# Patient Record
Sex: FEMALE | Race: WHITE | NOT HISPANIC OR LATINO | Employment: FULL TIME | URBAN - METROPOLITAN AREA
[De-identification: names, ages, dates, MRNs, and addresses within clinical notes are randomized per-mention and may not be internally consistent; named-entity substitution may affect disease eponyms.]

---

## 2017-10-18 ENCOUNTER — CONVERSION ENCOUNTER (OUTPATIENT)
Dept: OTHER | Facility: HOSPITAL | Age: 47
End: 2017-10-18

## 2018-08-22 ENCOUNTER — OFFICE VISIT CONVERTED (OUTPATIENT)
Dept: FAMILY MEDICINE CLINIC | Age: 48
End: 2018-08-22
Attending: NURSE PRACTITIONER

## 2019-10-02 ENCOUNTER — OFFICE VISIT CONVERTED (OUTPATIENT)
Dept: FAMILY MEDICINE CLINIC | Age: 49
End: 2019-10-02
Attending: FAMILY MEDICINE

## 2021-05-18 NOTE — PROGRESS NOTES
"Alessandra Potts 1970     Office/Outpatient Visit    Visit Date: Wed, Oct 2, 2019 02:59 pm    Provider: Bossman Bagley MD (Assistant: Marya Duffy RN)    Location: [facility]        Electronically signed by Bossman Bagley MD on  10/02/2019 04:59:18 PM                             SUBJECTIVE:        CC:     Mrs. Potts is a 49 year old White female.  Nodules to right side of neck;         HPI:         PHQ-9 Depression Screening: Completed form scanned and in chart; Total Score 4     She reports that approximately 36 hours ago she noticed a bump on the right side of her neck.  It is since grown in size, become red and very tender to the touch.  She does not recall getting bitten by anything or any other  inciting event. No new exposures including no new soaps, detergent, perfumes or lotions. She has never experienced anything like this before.  There is no drainage from the lesion.  She denies fever or chills.  No upper respiratory symptoms.  No dysphagia.     ROS:     CONSTITUTIONAL:  Negative for chills, fatigue and fever.      E/N/T:  Negative for nasal congestion, frequent rhinorrhea, sinus pressure and sore throat.      CARDIOVASCULAR:  Negative for chest pain, dizziness and edema.      RESPIRATORY:  Negative for dyspnea and cough.      GASTROINTESTINAL:  Negative for dysphagia and nausea.      INTEGUMENTARY/BREAST:  Positive for erythematous, tender neck \"bump\".      NEUROLOGICAL:  Negative for headaches and weakness.      ENDOCRINE:  Negative for hair loss, heat/cold intolerance, polydipsia, and polyphagia.          [facility]/FM/SH:     Last Reviewed on 10/02/2019 04:57 PM by Bossman Bagley    Past Medical History:                 PAST MEDICAL HISTORY         Endometriosis         GYNECOLOGICAL HISTORY:     miscarriage 1    Contraception: S/P tubal ligation;         Surgical History:         Appendectomy    Tonsillectomy/Adenoidectomy    Tubal Ligation         Family History:     Father: " "Hypertension; Hyperlipidemia     Mother: Healthy     maternal aunts (3) with breasr cancer         Social History:     Occupation: Teacher     Marital Status:      Children: 4 children         Tobacco/Alcohol/Supplements:     Last Reviewed on 10/02/2019 04:57 PM by Bossman Bagley    Tobacco: She has never smoked.  Non-drinker         Substance Abuse History:     Last Reviewed on 10/02/2019 04:57 PM by Bossman Bagley        Mental Health History:     Last Reviewed on 10/02/2019 04:57 PM by Bossman Bagley        Communicable Diseases (eg STDs):     Last Reviewed on 10/02/2019 04:57 PM by Bossman Bagley            Current Problems:     Last Reviewed on 10/02/2019 04:57 PM by Bossman Bagley    Fever of unknown origin (FUO)     Vitamin D deficiency     Anxiety     Fatigue     Neck mass     Screening for depression         Immunizations:     None        Allergies:     Last Reviewed on 10/02/2019 04:57 PM by Bossman Bagley      No Known Drug Allergies.         Current Medications:     Last Reviewed on 10/02/2019 04:57 PM by Bossman Bagley    None        OBJECTIVE:        Vitals:         Current: 10/2/2019 3:06:39 PM    Ht:  5 ft, 1.25 in;  Wt: 171.8 lbs;  BMI: 32.2    T: 98.6 F (oral);  BP: 134/79 mm Hg (right arm, sitting);  P: 99 bpm (right arm (BP Cuff), sitting);  sCr: 0.67 mg/dL;  GFR: 105.37        Exams:     PHYSICAL EXAM:     GENERAL: vital signs recorded - well developed, well nourished;  no apparent distress;     EYES: conjunctiva and cornea are normal;     E/N/T:  normal EACs, TMs, nasal/oral mucosa, teeth, gingiva, and oropharynx;     NECK: trachea is midline; thyroid is non-palpable;     RESPIRATORY: Clear to auscultation bilaterally; no rales (\"crackles\") present; no rhonchi; no wheezes;     CARDIOVASCULAR: normal rate; rhythm is regular;  No murmurs. clicks, gallops or rubs appreciated; no edema;     LYMPHATIC: no enlargement of cervical or facial nodes;     BREAST/INTEGUMENT: 2cm x1.5cm erythematous nodule " on righ neck inferior to angle of jaw that is tender to palpation without fluctuance, ulceration or drainage;     NEUROLOGIC: Grossly intact; mental status: alert and oriented x 3;     PSYCHIATRIC: appropriate affect and demeanor; normal speech pattern; Normal behavior;         ASSESSMENT           784.2   D14.32   R22.1  Neck mass              DDx: - Insect bite vs cellulitis vs folliculitis vs cyst vs lymphadenitis     V79.0   Z13.31  Screening for depression - Negative for depression              DDx:         ORDERS:         Meds Prescribed:       Doxycycline  Hyclate 100mg Capsules Take 1 capsule(s) by mouth bid for 7 days  #14 (Fourteen) capsule(s) Refills: 0         Radiology/Test Orders:       16115  US, soft tissues of head & neck (eg, thyroid, parathyroid, parotid), real time w/image documentation  (Send-Out)           Other Orders:         Depression screen negative  (In-House)                   PLAN:          Neck mass - Will start antibiotics, doxycycline 100 mg twice daily x7 days,  to cover any infectious etiologies.  We will also order imaging to further characterize the lesion.  Patient advised that she can use Tylenol and/or ibuprofen for pain control.  ED/return precautions given.         RADIOLOGY:  I have ordered Neck Ultrasound to be done today.            Prescriptions:       Doxycycline  Hyclate 100mg Capsules Take 1 capsule(s) by mouth bid for 7 days  #14 (Fourteen) capsule(s) Refills: 0           Orders:       03455  US, soft tissues of head & neck (eg, thyroid, parathyroid, parotid), real time w/image documentation  (Send-Out)            Screening for depression     MIPS PHQ-9 Depression Screening: Completed form scanned and in chart; Total Score 4; Negative Depression Screen           Orders:         Depression screen negative  (In-House)               CHARGE CAPTURE           **Please note: ICD descriptions below are intended for billing purposes only and may not represent  clinical diagnoses**        Primary Diagnosis:         784.2 Neck mass            D14.32    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

## 2021-05-18 NOTE — PROGRESS NOTES
Alessandra PottsOtto 1970     Office/Outpatient Visit    Visit Date: Wed, Aug 22, 2018 04:33 pm    Provider: Coral Haynes N.P. (Assistant: Loni Diana MA)    Location: [facility]        Electronically signed by Coral Haynes N.P. on  08/23/2018 09:16:14 PM                             SUBJECTIVE:        CC:     Mrs. Potts is a 48 year old White female.  Fever, Right side/rib pain, nausea, diarrhea, body aches since Sunday;         HPI:         x 3 days 101 max.          Additionally, she presents with history of rUQ abdominal pain.  this is located primarily in the right upper quadrant.  It does not radiate.  It began 3 days ago.  The onset of pain occurred with no apparent trigger.  She characterizes it as aching.  It is of moderate intensity.  She estimates that the frequency of pain is waxes and wanes.  The typical duration is less than a minute.  Aggravating factors include meals.  Nothing relieves the pain.  Associated symptoms include anorexia and fever.  She denies change in bowel habits, constipation, diarrhea, dysuria, nausea, red bloody stools, a change in stool caliber or vomiting.  No family members or other contacts are similarly ill.  went to [facility] monday.  advised to go to [facility].  pt states she [facility] not go to [facility].      ROS:     CONSTITUTIONAL:  Positive for fatigue and fever.      CARDIOVASCULAR:  Negative for chest pain, palpitations, tachycardia, orthopnea, and edema.      RESPIRATORY:  Negative for cough, dyspnea, and hemoptysis.      GASTROINTESTINAL:  Positive for abdominal pain ( RUQ ) and anorexia.   Negative for acid reflux symptoms, abdominal bloating, dysphagia, constipation, diarrhea, heartburn, hematochezia, nausea or vomiting.      GENITOURINARY:  Negative for dysuria.      MUSCULOSKELETAL:  Negative for arthralgias, back pain, and myalgias.      NEUROLOGICAL:  Negative for dizziness, headaches, paresthesias, and weakness.          PMH/FMH/SH:      Last Reviewed on 2018 09:06 AM by Coral Haynes    Past Medical History:                 PAST MEDICAL HISTORY         Endometriosis         GYNECOLOGICAL HISTORY:     miscarriage 1    Contraception: S/P tubal ligation;         Surgical History:         Appendectomy    Tonsillectomy/Adenoidectomy    Tubal Ligation         Family History:     Father: Hypertension; Hyperlipidemia     Mother: Healthy     maternal aunts (3) with breasr cancer         Social History:     Occupation: Teacher     Marital Status:      Children: 4 children         Tobacco/Alcohol/Supplements:     Last Reviewed on 2018 04:37 PM by Jordyn Ford    Tobacco: She has never smoked.  Non-drinker             Current Problems:     Fever of unknown origin (FUO)     Vitamin D deficiency     Anxiety     Fatigue     RUQ abdominal pain         Immunizations:     None        Allergies:     Last Reviewed on 2015 05:06 PM by Bessy Haynes      No Known Drug Allergies.         Current Medications:     Last Reviewed on 2018 04:36 PM by Jordyn Ford      No Known Medications.         OBJECTIVE:        Vitals:         Historical:     2015  BP:   126/68 mm Hg ( (left arm, , sitting, );)     2015  Wt:   181.3lbs        Current: 2018 4:39:14 PM    Ht:  5 ft, 1.25 in;  Wt: 176 lbs;  BMI: 33.0    T: 102.2 F (oral);  BP: 138/69 mm Hg (left arm, sitting);  P: 114 bpm (left arm (BP Cuff), sitting);  sCr: 0.8 mg/dL;  GFR: 90.09        Exams:     PHYSICAL EXAM:     GENERAL:  well developed and nourished; appropriately groomed; in no apparent distress;     E/N/T: EARS: bilateral TMs are normal;  NOSE: normal nasal mucosa; OROPHARYNX: posterior pharynx, including tonsils, tongue, and uvula are normal;     RESPIRATORY: normal respiratory rate and pattern with no distress; normal breath sounds with no rales, rhonchi, wheezes or rubs;     CARDIOVASCULAR: normal rate; rhythm is regular;     GASTROINTESTINAL:  nontender; normal bowel sounds;     MUSCULOSKELETAL:  Normal range of motion, strength and tone;     NEUROLOGIC: mental status: alert and oriented x 3; GROSSLY INTACT     PSYCHIATRIC:  appropriate affect and demeanor; normal speech pattern; grossly normal memory;         ASSESSMENT           780.60   R50.9  Fever of unknown origin (FUO)              DDx:     789.01   R10.11  RUQ abdominal pain              DDx:         ORDERS:         Lab Orders:       [id number]  BDCBC - [facility] CBC with 3 part diff  (Send-Out)         77922  BDUAM - [facility] Urinalysis, automated, with micro  (Send-Out)         86782  AMYS - [facility] Amylase, Serum  (Send-Out)         35625  COMP - [facility] Comp. Metabolic Panel  (Send-Out)         53119  LIP - [facility] Lipase, Serum  (Send-Out)                   PLAN:          Fever of unknown origin (FUO)     LABORATORY:  Labs ordered to be performed today include CBC and urinalysis with micro.            Orders:       07387  BDCBC - [facility] CBC with 3 part diff  (Send-Out)         19683  BDUAM - [facility] Urinalysis, automated, with micro  (Send-Out)            RUQ abdominal pain         RECOMMENDATIONS given include: discontinue use of NSAIDs and aspirin, avoid spicy foods, and maintain a clear liquid diet advance as tolerated.  to ER if worsens.  labs pending..  LABORATORY:  Labs ordered to be performed today include amylase, Comprehensive metabolic panel, and Lipase.            Orders:       [id number]  AMYS - [facility] Amylase, Serum  (Send-Out)         27043  COMP - [facility] Comp. Metabolic Panel  (Send-Out)         59333  LIP - [facility] Lipase, Serum  (Send-Out)               Patient Recommendations:        For  RUQ abdominal pain:         Stop taking aspirin and anti-inflammatory medications such as ibuprofen and naproxen.     Avoid spicy foods in your diet.     Maintain a diet consisting of clear liquids (clear beverages, broth, gelatin, flavored ices, sports drinks, etc.).  Avoid solid foods.              CHARGE CAPTURE           **Please note:  ICD descriptions below are intended for billing purposes only and may not represent clinical diagnoses**        Primary Diagnosis:         780.60 Fever of unknown origin (FUO)            R50.9    Fever, unspecified              Orders:          41100   Office visit - new pt, level 2  (In-House)           789.01 RUQ abdominal pain            R10.11    Right upper quadrant pain

## 2021-06-04 ENCOUNTER — HOSPITAL ENCOUNTER (OUTPATIENT)
Dept: OTHER | Facility: HOSPITAL | Age: 51
Discharge: HOME | End: 2021-06-04
Attending: NURSE PRACTITIONER

## 2021-06-04 ENCOUNTER — OFFICE VISIT CONVERTED (OUTPATIENT)
Dept: FAMILY MEDICINE CLINIC | Age: 51
End: 2021-06-04
Attending: NURSE PRACTITIONER

## 2021-06-04 LAB
ALBUMIN SERPL-MCNC: 4.6 G/DL (ref 3.5–5)
ALBUMIN/GLOB SERPL: 1.7 {RATIO} (ref 1.4–2.6)
ALP SERPL-CCNC: 81 U/L (ref 42–98)
ALT SERPL-CCNC: 15 U/L (ref 10–40)
ANION GAP SERPL CALC-SCNC: 11 MMOL/L (ref 8–19)
AST SERPL-CCNC: 13 U/L (ref 15–50)
BASOPHILS # BLD MANUAL: 0.03 10*3/UL (ref 0–0.2)
BASOPHILS NFR BLD MANUAL: 0.5 % (ref 0–3)
BILIRUB SERPL-MCNC: 0.25 MG/DL (ref 0.2–1.3)
BUN SERPL-MCNC: 12 MG/DL (ref 5–25)
BUN/CREAT SERPL: 17 {RATIO} (ref 6–20)
CALCIUM SERPL-MCNC: 9 MG/DL (ref 8.7–10.4)
CHLORIDE SERPL-SCNC: 104 MMOL/L (ref 99–111)
CHOLEST SERPL-MCNC: 240 MG/DL (ref 107–200)
CHOLEST/HDLC SERPL: 5.5 {RATIO} (ref 3–6)
CONV CO2: 28 MMOL/L (ref 22–32)
CONV TOTAL PROTEIN: 7.3 G/DL (ref 6.3–8.2)
CREAT UR-MCNC: 0.72 MG/DL (ref 0.5–0.9)
DEPRECATED RDW RBC AUTO: 42 FL
EOSINOPHIL # BLD MANUAL: 0.06 10*3/UL (ref 0–0.7)
EOSINOPHIL NFR BLD MANUAL: 1.1 % (ref 0–7)
ERYTHROCYTE [DISTWIDTH] IN BLOOD BY AUTOMATED COUNT: 12.5 % (ref 11.5–14.5)
GFR SERPLBLD BASED ON 1.73 SQ M-ARVRAT: >60 ML/MIN/{1.73_M2}
GLOBULIN UR ELPH-MCNC: 2.7 G/DL (ref 2–3.5)
GLUCOSE SERPL-MCNC: 95 MG/DL (ref 65–99)
GRANS (ABSOLUTE): 3.76 10*3/UL (ref 2–8)
GRANS: 67.9 % (ref 30–85)
HBA1C MFR BLD: 13.8 G/DL (ref 12–16)
HCT VFR BLD AUTO: 42.8 % (ref 37–47)
HDLC SERPL-MCNC: 44 MG/DL (ref 40–60)
IMM GRANULOCYTES # BLD: 0.01 10*3/UL (ref 0–0.54)
IMM GRANULOCYTES NFR BLD: 0.2 % (ref 0–0.43)
LDLC SERPL CALC-MCNC: 155 MG/DL (ref 70–100)
LYMPHOCYTES # BLD MANUAL: 1.28 10*3/UL (ref 1–5)
LYMPHOCYTES NFR BLD MANUAL: 7.2 % (ref 3–10)
MCH RBC QN AUTO: 29.2 PG (ref 27–31)
MCHC RBC AUTO-ENTMCNC: 32.2 G/DL (ref 33–37)
MCV RBC AUTO: 90.5 FL (ref 81–99)
MONOCYTES # BLD AUTO: 0.4 10*3/UL (ref 0.2–1.2)
OSMOLALITY SERPL CALC.SUM OF ELEC: 288 MOSM/KG (ref 273–304)
PLATELET # BLD AUTO: 244 10*3/UL (ref 130–400)
PMV BLD AUTO: 10.2 FL (ref 7.4–10.4)
POTASSIUM SERPL-SCNC: 4.3 MMOL/L (ref 3.5–5.3)
RBC # BLD AUTO: 4.73 10*6/UL (ref 4.2–5.4)
SODIUM SERPL-SCNC: 139 MMOL/L (ref 135–147)
TRIGL SERPL-MCNC: 206 MG/DL (ref 40–150)
TSH SERPL-ACNC: 1.47 M[IU]/L (ref 0.27–4.2)
VARIANT LYMPHS NFR BLD MANUAL: 23.1 % (ref 20–45)
VLDLC SERPL-MCNC: 41 MG/DL (ref 5–37)
WBC # BLD AUTO: 5.54 10*3/UL (ref 4.8–10.8)

## 2021-06-05 NOTE — PROGRESS NOTES
Alessandra Potts  1970     Office/Outpatient Visit    Visit Date:  09:05 am    Provider: Dwayne Arzola N.P. (Assistant: Katie Deutsch MA)    Location: [facility]        Electronically signed by Dwayne Arzola N.P. on  2021 10:11:36 AM                             Subjective:        CC: Mrs. Potts is a 51 year old White female.  Checkup, has been having palpitations and chest pains intermittent but worsening over the last two weeks. Denies shortness of breath, and blurred vision, positive for frequent headaches. does not check blood pressure at home;         HPI:       Here with new onset mid left chest pressure and palpitations, were occurring rarely then over last 2 weeks have become more on daily basis, come and go on their own , happen regardless of activity  ,denies SOB, Nausea, sweating, neck jaw or arm pain. Has noticed her BP has been elevated in the last few weeks more than normal and does have fm hx of HTN , no new medications , does have some stress from job     ROS:     CONSTITUTIONAL:  Positive for fatigue.   Negative for fever.      CARDIOVASCULAR:  Positive for chest pain ( pressure mid chest ) and palpitations.      RESPIRATORY:  Negative for recent cough, dyspnea and frequent wheezing.      GASTROINTESTINAL:  Negative for abdominal pain, constipation, diarrhea, nausea and vomiting.      GENITOURINARY:  Negative for dysuria and hematuria.      NEUROLOGICAL:  Positive for headaches ( tension ).   Negative for dizziness, memory loss, paresthesias, tremor or weakness.      PSYCHIATRIC:  Negative for anxiety, depression, and sleep disturbances.          Past Medical History / Family History / Social History:         Last Reviewed on 2021 09:30 AM by Dwayne Arzola    Past Medical History:                 PAST MEDICAL HISTORY         Endometriosis         GYNECOLOGICAL HISTORY:     miscarriage 1    Contraception: S/P tubal ligation;          PREVENTIVE HEALTH MAINTENANCE             MAMMOGRAM: was last done 2019 with normal results         Surgical History:         Appendectomy    Tonsillectomy/Adenoidectomy    Tubal Ligation         Family History:     Father: Hypertension; Hyperlipidemia     Mother: Healthy     maternal aunts (3) with breasr cancer         Social History:     Occupation: Teacher     Marital Status:      Children: 4 children         Tobacco/Alcohol/Supplements:     Last Reviewed on 6/04/2021 09:30 AM by Dwayne Arzola    Tobacco: She has never smoked.  Non-drinker         Mental Health History:     Last Reviewed on 6/04/2021 09:30 AM by Dwayne Arzola        Current Problems:     Last Reviewed on 6/04/2021 09:30 AM by Dwayne Arzola    Chest pain, unspecified        Immunizations:     COVID-19 vaccine, Subunit, rS-nanoparticle+Matrix-M1 Adjuvant, PF, 0.5 mL 2/5/2021    SARS-COV-2 (COVID-19) vaccine, UNSPECIFIED 3/5/2021        Allergies:     Last Reviewed on 6/04/2021 09:30 AM by Dwayne Arzola    No Known Allergies.        Current Medications:     Last Reviewed on 6/04/2021 09:30 AM by Dwayne Arzola    melatonin 5 mg oral capsule    ascorbic acid (vitamin C) 125 mg oral tablet,chewable        Objective:        Vitals:         Current: 6/4/2021 9:10:58 AM    Ht:  5 ft, 1.25 in;  Wt: 189 lbs;  BMI: 35.4T: 98.7 F (oral);  BP: 140/76 mm Hg (left arm, sitting);  P: 90 bpm (left arm (BP Cuff), sitting);  sCr: 0.67 mg/dL;  GFR: 107.43        Exams:     PHYSICAL EXAM:     GENERAL: vital signs recorded - well developed, well nourished;  well groomed;  no apparent distress;     NECK: thyroid is non-palpable;     RESPIRATORY: normal respiratory rate and pattern with no distress; normal breath sounds with no rales, rhonchi, wheezes or rubs;     CARDIOVASCULAR: normal rate; rhythm is regular;  no systolic murmur; no edema;     NEUROLOGIC: GROSSLY INTACT     PSYCHIATRIC:  appropriate affect and demeanor; normal  speech pattern; grossly normal memory;         Lab/Test Results:         LABORATORY RESULTS: EKG performed by tls         Procedures:     Chest pain, unspecified        ECG Interpretation: normal rate, rhythm, and axis; no ST-T abnormalities;     Holter Monitor: Holter monitor was hooked up, Mrs. Potts was given instructions on use.  Patient informed to return with device. 48 hour monitor tls             Assessment:         R07.9   Chest pain, unspecified           ORDERS:         Radiology/Test Orders:       [id number]  Electrocardiogram, routine with at least 12 leads; with interpretation and report  (In-House)            66340  Holter monitor connection and disconnection  (In-House)              Lab Orders:       [id number]  [facility] PHYSICAL: CMP, CBC, TSH, LIPID: [id number], [id number], [id number], [id number]  (Send-Out)                      Plan:         Chest pain, unspecifiedDepending on Holter, may need to try low dose BB     LABORATORY:  Labs ordered to be performed today include PHYSICAL PANEL; CMP, CBC, TSH, LIPID.      TESTS/PROCEDURES:  Will proceed with Holter Monitor 48 hour to be performed/scheduled now.            Orders:       [id number]  Electrocardiogram, routine with at least 12 leads; with interpretation and report  (In-House)            43393  [facility] PHYSICAL: CMP, CBC, TSH, LIPID: [id number], [id number], [id number], [id number]  (Send-Out)            20587  Holter monitor connection and disconnection  (In-House)                  Charge Capture:         Primary Diagnosis:     R07.9  Chest pain, unspecified           Orders:      76854  Office/outpatient visit; established patient, level 3  (In-House)            31910  Electrocardiogram, routine with at least 12 leads; with interpretation and report  (In-House)            70228  Holter monitor connection and disconnection  (In-House)

## 2021-06-07 DIAGNOSIS — R00.2 PALPITATIONS: Primary | ICD-10-CM

## 2021-06-10 LAB
MAXIMAL PREDICTED HEART RATE: 171 BPM
STRESS TARGET HR: 145 BPM

## 2021-07-01 VITALS
HEART RATE: 114 BPM | BODY MASS INDEX: 33.23 KG/M2 | SYSTOLIC BLOOD PRESSURE: 138 MMHG | DIASTOLIC BLOOD PRESSURE: 69 MMHG | TEMPERATURE: 102.2 F | WEIGHT: 176 LBS | HEIGHT: 61 IN

## 2021-07-01 VITALS
HEART RATE: 99 BPM | HEIGHT: 61 IN | BODY MASS INDEX: 32.44 KG/M2 | DIASTOLIC BLOOD PRESSURE: 79 MMHG | SYSTOLIC BLOOD PRESSURE: 134 MMHG | WEIGHT: 171.8 LBS | TEMPERATURE: 98.6 F

## 2021-07-02 VITALS
WEIGHT: 189 LBS | BODY MASS INDEX: 35.68 KG/M2 | HEART RATE: 90 BPM | HEIGHT: 61 IN | TEMPERATURE: 98.7 F | SYSTOLIC BLOOD PRESSURE: 140 MMHG | DIASTOLIC BLOOD PRESSURE: 76 MMHG

## 2021-10-11 ENCOUNTER — TRANSCRIBE ORDERS (OUTPATIENT)
Dept: ADMINISTRATIVE | Facility: HOSPITAL | Age: 51
End: 2021-10-11

## 2021-10-11 DIAGNOSIS — Z12.31 VISIT FOR SCREENING MAMMOGRAM: Primary | ICD-10-CM

## 2021-11-10 ENCOUNTER — HOSPITAL ENCOUNTER (OUTPATIENT)
Dept: MAMMOGRAPHY | Facility: HOSPITAL | Age: 51
Discharge: HOME | End: 2021-11-10
Admitting: OBSTETRICS & GYNECOLOGY

## 2021-11-10 DIAGNOSIS — Z12.31 VISIT FOR SCREENING MAMMOGRAM: ICD-10-CM

## 2021-11-10 PROCEDURE — 77067 SCR MAMMO BI INCL CAD: CPT

## 2021-11-10 PROCEDURE — 77063 BREAST TOMOSYNTHESIS BI: CPT

## 2022-11-30 ENCOUNTER — TRANSCRIBE ORDERS (OUTPATIENT)
Dept: ADMINISTRATIVE | Facility: HOSPITAL | Age: 52
End: 2022-11-30

## 2022-11-30 DIAGNOSIS — Z12.31 SCREENING MAMMOGRAM FOR BREAST CANCER: Primary | ICD-10-CM

## 2023-01-30 ENCOUNTER — HOSPITAL ENCOUNTER (OUTPATIENT)
Dept: MAMMOGRAPHY | Facility: HOSPITAL | Age: 53
Discharge: HOME | End: 2023-01-30
Admitting: OBSTETRICS & GYNECOLOGY
Payer: COMMERCIAL

## 2023-01-30 DIAGNOSIS — Z12.31 SCREENING MAMMOGRAM FOR BREAST CANCER: ICD-10-CM

## 2023-01-30 PROCEDURE — 77067 SCR MAMMO BI INCL CAD: CPT

## 2023-01-30 PROCEDURE — 77063 BREAST TOMOSYNTHESIS BI: CPT

## 2024-04-09 ENCOUNTER — OFFICE VISIT (OUTPATIENT)
Dept: FAMILY MEDICINE CLINIC | Age: 54
End: 2024-04-09
Payer: COMMERCIAL

## 2024-04-09 VITALS
HEIGHT: 61 IN | DIASTOLIC BLOOD PRESSURE: 90 MMHG | HEART RATE: 75 BPM | WEIGHT: 186 LBS | BODY MASS INDEX: 35.12 KG/M2 | SYSTOLIC BLOOD PRESSURE: 140 MMHG

## 2024-04-09 DIAGNOSIS — Z11.59 NEED FOR HEPATITIS C SCREENING TEST: ICD-10-CM

## 2024-04-09 DIAGNOSIS — Z13.29 SCREENING FOR THYROID DISORDER: ICD-10-CM

## 2024-04-09 DIAGNOSIS — Z12.11 SCREEN FOR COLON CANCER: ICD-10-CM

## 2024-04-09 DIAGNOSIS — Z12.11 SCREENING FOR COLON CANCER: ICD-10-CM

## 2024-04-09 DIAGNOSIS — E55.9 VITAMIN D DEFICIENCY: ICD-10-CM

## 2024-04-09 DIAGNOSIS — Z00.00 ANNUAL PHYSICAL EXAM: Primary | ICD-10-CM

## 2024-04-09 DIAGNOSIS — Z00.00 ENCOUNTER FOR MEDICAL EXAMINATION TO ESTABLISH CARE: ICD-10-CM

## 2024-04-09 DIAGNOSIS — N92.1 MENORRHAGIA WITH IRREGULAR CYCLE: ICD-10-CM

## 2024-04-09 DIAGNOSIS — Z12.31 BREAST CANCER SCREENING BY MAMMOGRAM: ICD-10-CM

## 2024-04-09 DIAGNOSIS — Z00.00 ROUTINE ADULT HEALTH MAINTENANCE: ICD-10-CM

## 2024-04-09 DIAGNOSIS — Z13.220 SCREENING FOR LIPID DISORDERS: ICD-10-CM

## 2024-04-09 DIAGNOSIS — Z13.31 NEGATIVE DEPRESSION SCREENING: ICD-10-CM

## 2024-04-09 DIAGNOSIS — I10 PRIMARY HYPERTENSION: ICD-10-CM

## 2024-04-09 DIAGNOSIS — E66.9 OBESITY (BMI 30.0-34.9): ICD-10-CM

## 2024-04-09 RX ORDER — LISINOPRIL 10 MG/1
10 TABLET ORAL DAILY
Qty: 30 TABLET | Refills: 0 | Status: SHIPPED | OUTPATIENT
Start: 2024-04-09

## 2024-04-09 RX ORDER — CETIRIZINE HYDROCHLORIDE 10 MG/1
10 TABLET ORAL DAILY
COMMUNITY

## 2024-04-09 NOTE — PROGRESS NOTES
"Alessandra Potts presents to [facility] with complaint of  Establish Care (PAP - 10/2022 - Normal /MAMMO - 2023 - Normal )    SUBJECTIVE  History of Present Illness    Very pleasant patient is here to establish care.  Was seeing Dwayne Arzola has not seen a PCP for 3 years.  She is  and has 4 children.  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  She does not have any past chronic medical conditions.  She has surgical history of appendectomy, , endometrial ablation, and tonsillectomy.  He has family history of breast cancer, hyperlipidemia.  She has not been a smoker, denies alcohol or drug use.  Patient would like to get up-to-date on screenings.  Patient has also been concerned with elevated blood pressure readings.  Her initial blood pressure today is 170/94, Recheck is 140/90.  Home blood pressure readings typically 140s over 90s.  He has never required antihypertensive in the past.  Patient is asymptomatic of elevated blood pressure.    The following portions of the patient's history were reviewed and updated as appropriate: allergies, current medications, past family history, past medical history, past social history, past surgical history and problem list.  Pap smear was in 2022.  Patient has been having heavy periods and is asking for menopause labs. She has no other issues or concerns she wishes to address today.    OBJECTIVE  Vital Signs:   /90   Pulse 75   Ht 155.6 cm (61.25\")   Wt 84.4 kg (186 lb)   BMI 34.86 kg/m²       Physical Exam  Vitals reviewed.   Constitutional:       General: She is not in acute distress.     Appearance: Normal appearance. She is obese. She is not ill-appearing.   HENT:      Head: Normocephalic and atraumatic.      Right Ear: Tympanic membrane and ear canal normal.      Left Ear: Tympanic membrane and ear canal normal.      Nose: Nose normal.      Mouth/Throat:      Mouth: Mucous " membranes are moist.      Pharynx: Oropharynx is clear.   Neck:      Thyroid: No thyroid mass, thyromegaly or thyroid tenderness.   Cardiovascular:      Rate and Rhythm: Normal rate and regular rhythm.      Pulses: Normal pulses.      Heart sounds: Normal heart sounds.   Pulmonary:      Effort: Pulmonary effort is normal.      Breath sounds: Normal breath sounds.   Musculoskeletal:      Cervical back: Neck supple.   Lymphadenopathy:      Cervical: No cervical adenopathy.   Skin:     General: Skin is warm and dry.   Neurological:      General: No focal deficit present.      Mental Status: She is alert and oriented to person, place, and time. Mental status is at baseline.   Psychiatric:         Mood and Affect: Mood normal.         Behavior: Behavior normal.         Judgment: Judgment normal.              ASSESSMENT AND PLAN:  Diagnoses and all orders for this visit:    1. Annual physical exam (Primary)    2. Breast cancer screening by mammogram  -     Mammo Screening Digital Tomosynthesis Bilateral With CAD; Future    3. Negative depression screening    4. Screening for colon cancer  -     Ambulatory Referral For Screening Colonoscopy    5. Screen for colon cancer  -     Ambulatory Referral For Screening Colonoscopy    6. Routine adult health maintenance  -     CBC & Differential; Future  -     Comprehensive Metabolic Panel; Future    7. Need for hepatitis C screening test  -     Hepatitis C Antibody; Future    8. Screening for lipid disorders  -     Lipid Panel; Future    9. Screening for thyroid disorder  -     TSH Rfx On Abnormal To Free T4; Future    10. Menorrhagia with irregular cycle  Assessment & Plan:  Encouraged to make apt with her OBGYN      11. Vitamin D deficiency  -     Vitamin D 25 hydroxy; Future    12. Encounter for medical examination to establish care    13. Primary hypertension  Assessment & Plan:  Patient has new onset Hypertension  Ambulatory BP monitoring  Medication changes per  orders.  Dietary sodium restriction.  Recommended strategies for weight loss.  Counseled on importance of healthy eating and regular aerobic exercise  Starting lisinopril 10 mg daily, ed on med SE  Blood pressure will be reassessed  3 weeks .    Orders:  -     lisinopril (PRINIVIL,ZESTRIL) 10 MG tablet; Take 1 tablet by mouth Daily.  Dispense: 30 tablet; Refill: 0    14. Obesity (BMI 30.0-34.9)  Assessment & Plan:  Patient's (Body mass index is 34.86 kg/m².) indicates that they are obese (BMI >30) with health conditions that include hypertension . Weight is newly identified. BMI  is above average; BMI management plan is completed. We discussed portion control and increasing exercise.           40 to 64: Counseling/Anticipatory Guidance Discussed: nutrition, physical activity, healthy weight, injury prevention, misuse of tobacco, alcohol and drugs, dental health, mental health, immunizations, screenings, and self-breast exam    Follow Up   Return in about 3 weeks (around 4/30/2024). Patient to notify office with any acute concerns or issues.  Patient verbalizes understanding, agrees with plan of care and has no further questions upon discharge.     Patient was given instructions and counseling regarding her condition or for health maintenance advice. Please see specific information pulled into the AVS if appropriate.     Discussed the importance of following up with any needed screening tests/labs/specialist appointments and any requested follow-up recommended by me today. Importance of maintaining follow-up discussed and patient accepts that missed appointments can delay diagnosis and potentially lead to worsening of conditions.    Part of this note may be an electronic transcription/translation of spoken language to printed text using the Dragon Dictation System.

## 2024-04-11 PROBLEM — I10 PRIMARY HYPERTENSION: Status: ACTIVE | Noted: 2024-04-11

## 2024-04-11 PROBLEM — E66.9 OBESITY (BMI 30.0-34.9): Status: ACTIVE | Noted: 2024-04-11

## 2024-04-11 PROBLEM — E66.811 OBESITY (BMI 30.0-34.9): Status: ACTIVE | Noted: 2024-04-11

## 2024-04-11 PROBLEM — N92.1 MENORRHAGIA WITH IRREGULAR CYCLE: Status: ACTIVE | Noted: 2024-04-11

## 2024-04-11 NOTE — ASSESSMENT & PLAN NOTE
Patient's (Body mass index is 34.86 kg/m².) indicates that they are obese (BMI >30) with health conditions that include hypertension . Weight is newly identified. BMI  is above average; BMI management plan is completed. We discussed portion control and increasing exercise.

## 2024-04-11 NOTE — ASSESSMENT & PLAN NOTE
Patient has new onset Hypertension  Ambulatory BP monitoring  Medication changes per orders.  Dietary sodium restriction.  Recommended strategies for weight loss.  Counseled on importance of healthy eating and regular aerobic exercise  Starting lisinopril 10 mg daily, ed on med SE  Blood pressure will be reassessed  3 weeks .

## 2024-04-12 ENCOUNTER — LAB (OUTPATIENT)
Dept: LAB | Facility: HOSPITAL | Age: 54
End: 2024-04-12
Payer: COMMERCIAL

## 2024-04-12 DIAGNOSIS — Z13.220 SCREENING FOR LIPID DISORDERS: ICD-10-CM

## 2024-04-12 DIAGNOSIS — Z13.29 SCREENING FOR THYROID DISORDER: ICD-10-CM

## 2024-04-12 DIAGNOSIS — Z00.00 ROUTINE ADULT HEALTH MAINTENANCE: ICD-10-CM

## 2024-04-12 DIAGNOSIS — E55.9 VITAMIN D DEFICIENCY: ICD-10-CM

## 2024-04-12 DIAGNOSIS — Z11.59 NEED FOR HEPATITIS C SCREENING TEST: ICD-10-CM

## 2024-04-12 LAB
25(OH)D3 SERPL-MCNC: 44.9 NG/ML (ref 30–100)
ALBUMIN SERPL-MCNC: 4.4 G/DL (ref 3.5–5.2)
ALBUMIN/GLOB SERPL: 1.8 G/DL
ALP SERPL-CCNC: 82 U/L (ref 39–117)
ALT SERPL W P-5'-P-CCNC: 19 U/L (ref 1–33)
ANION GAP SERPL CALCULATED.3IONS-SCNC: 8.2 MMOL/L (ref 5–15)
AST SERPL-CCNC: 19 U/L (ref 1–32)
BASOPHILS # BLD AUTO: 0.02 10*3/MM3 (ref 0–0.2)
BASOPHILS NFR BLD AUTO: 0.4 % (ref 0–1.5)
BILIRUB SERPL-MCNC: 0.3 MG/DL (ref 0–1.2)
BUN SERPL-MCNC: 15 MG/DL (ref 6–20)
BUN/CREAT SERPL: 19.2 (ref 7–25)
CALCIUM SPEC-SCNC: 9.3 MG/DL (ref 8.6–10.5)
CHLORIDE SERPL-SCNC: 107 MMOL/L (ref 98–107)
CHOLEST SERPL-MCNC: 228 MG/DL (ref 0–200)
CO2 SERPL-SCNC: 25.8 MMOL/L (ref 22–29)
CREAT SERPL-MCNC: 0.78 MG/DL (ref 0.57–1)
DEPRECATED RDW RBC AUTO: 42.5 FL (ref 37–54)
EGFRCR SERPLBLD CKD-EPI 2021: 91.5 ML/MIN/1.73
EOSINOPHIL # BLD AUTO: 0.11 10*3/MM3 (ref 0–0.4)
EOSINOPHIL NFR BLD AUTO: 2.2 % (ref 0.3–6.2)
ERYTHROCYTE [DISTWIDTH] IN BLOOD BY AUTOMATED COUNT: 12.3 % (ref 12.3–15.4)
GLOBULIN UR ELPH-MCNC: 2.5 GM/DL
GLUCOSE SERPL-MCNC: 92 MG/DL (ref 65–99)
HCT VFR BLD AUTO: 42 % (ref 34–46.6)
HCV AB SER DONR QL: NORMAL
HDLC SERPL-MCNC: 46 MG/DL (ref 40–60)
HGB BLD-MCNC: 13.4 G/DL (ref 12–15.9)
IMM GRANULOCYTES # BLD AUTO: 0.01 10*3/MM3 (ref 0–0.05)
IMM GRANULOCYTES NFR BLD AUTO: 0.2 % (ref 0–0.5)
LDLC SERPL CALC-MCNC: 160 MG/DL (ref 0–100)
LDLC/HDLC SERPL: 3.42 {RATIO}
LYMPHOCYTES # BLD AUTO: 1.46 10*3/MM3 (ref 0.7–3.1)
LYMPHOCYTES NFR BLD AUTO: 29.1 % (ref 19.6–45.3)
MCH RBC QN AUTO: 29.3 PG (ref 26.6–33)
MCHC RBC AUTO-ENTMCNC: 31.9 G/DL (ref 31.5–35.7)
MCV RBC AUTO: 91.9 FL (ref 79–97)
MONOCYTES # BLD AUTO: 0.43 10*3/MM3 (ref 0.1–0.9)
MONOCYTES NFR BLD AUTO: 8.6 % (ref 5–12)
NEUTROPHILS NFR BLD AUTO: 2.98 10*3/MM3 (ref 1.7–7)
NEUTROPHILS NFR BLD AUTO: 59.5 % (ref 42.7–76)
PLATELET # BLD AUTO: 225 10*3/MM3 (ref 140–450)
PMV BLD AUTO: 10.7 FL (ref 6–12)
POTASSIUM SERPL-SCNC: 4.6 MMOL/L (ref 3.5–5.2)
PROT SERPL-MCNC: 6.9 G/DL (ref 6–8.5)
RBC # BLD AUTO: 4.57 10*6/MM3 (ref 3.77–5.28)
SODIUM SERPL-SCNC: 141 MMOL/L (ref 136–145)
TRIGL SERPL-MCNC: 124 MG/DL (ref 0–150)
TSH SERPL DL<=0.05 MIU/L-ACNC: 1.59 UIU/ML (ref 0.27–4.2)
VLDLC SERPL-MCNC: 22 MG/DL (ref 5–40)
WBC NRBC COR # BLD AUTO: 5.01 10*3/MM3 (ref 3.4–10.8)

## 2024-04-12 PROCEDURE — 80061 LIPID PANEL: CPT

## 2024-04-12 PROCEDURE — 86803 HEPATITIS C AB TEST: CPT

## 2024-04-12 PROCEDURE — 82306 VITAMIN D 25 HYDROXY: CPT

## 2024-04-12 PROCEDURE — 80050 GENERAL HEALTH PANEL: CPT

## 2024-04-12 PROCEDURE — 36415 COLL VENOUS BLD VENIPUNCTURE: CPT

## 2024-04-25 ENCOUNTER — HOSPITAL ENCOUNTER (OUTPATIENT)
Dept: MAMMOGRAPHY | Facility: HOSPITAL | Age: 54
Discharge: HOME | End: 2024-04-25
Admitting: NURSE PRACTITIONER
Payer: COMMERCIAL

## 2024-04-25 DIAGNOSIS — Z12.31 BREAST CANCER SCREENING BY MAMMOGRAM: ICD-10-CM

## 2024-04-25 PROCEDURE — 77063 BREAST TOMOSYNTHESIS BI: CPT

## 2024-04-25 PROCEDURE — 77067 SCR MAMMO BI INCL CAD: CPT

## 2024-04-30 ENCOUNTER — OFFICE VISIT (OUTPATIENT)
Dept: FAMILY MEDICINE CLINIC | Age: 54
End: 2024-04-30
Payer: COMMERCIAL

## 2024-04-30 VITALS
HEIGHT: 61 IN | WEIGHT: 185 LBS | BODY MASS INDEX: 34.93 KG/M2 | HEART RATE: 77 BPM | DIASTOLIC BLOOD PRESSURE: 78 MMHG | SYSTOLIC BLOOD PRESSURE: 138 MMHG

## 2024-04-30 DIAGNOSIS — E66.9 OBESITY (BMI 30.0-34.9): ICD-10-CM

## 2024-04-30 DIAGNOSIS — E78.2 MIXED HYPERLIPIDEMIA: ICD-10-CM

## 2024-04-30 DIAGNOSIS — I10 PRIMARY HYPERTENSION: Primary | ICD-10-CM

## 2024-04-30 PROCEDURE — 99214 OFFICE O/P EST MOD 30 MIN: CPT | Performed by: NURSE PRACTITIONER

## 2024-04-30 RX ORDER — LISINOPRIL 10 MG/1
10 TABLET ORAL DAILY
Qty: 90 TABLET | Refills: 1 | Status: SHIPPED | OUTPATIENT
Start: 2024-04-30

## 2024-04-30 NOTE — PROGRESS NOTES
"Alessandra Potts presents to [facility] with complaint of  Hypertension (3 wk follow up )    SUBJECTIVE  History of Present Illness    Patient is here for 3-week follow-up of hypertension.  At her last visit, she was started on lisinopril 10 mg daily.  Systolic blood pressure at home ranges from 119-128, diastolic ranging 77-89.  She also mentions that she feels better overall and is sleeping better as well.  Patient had screening labs completed that showed elevated cholesterol levels.  Her sister and father have elevated cholesterol.  Patient is trying to follow a low-cholesterol diet and plans on starting exercising regularly.      OBJECTIVE  Vital Signs:   /78 (BP Location: Left arm, Patient Position: Sitting)   Pulse 77   Ht 155.6 cm (61.25\")   Wt 83.9 kg (185 lb)   BMI 34.67 kg/m²       Physical Exam  Vitals reviewed.   Constitutional:       General: She is not in acute distress.     Appearance: Normal appearance. She is obese. She is not ill-appearing.   HENT:      Head: Normocephalic and atraumatic.      Nose: Nose normal.      Mouth/Throat:      Mouth: Mucous membranes are moist.      Pharynx: Oropharynx is clear.   Cardiovascular:      Rate and Rhythm: Normal rate and regular rhythm.      Pulses: Normal pulses.      Heart sounds: Normal heart sounds.   Pulmonary:      Effort: Pulmonary effort is normal.      Breath sounds: Normal breath sounds.   Musculoskeletal:      Cervical back: Neck supple.   Skin:     General: Skin is warm and dry.   Neurological:      General: No focal deficit present.      Mental Status: She is alert and oriented to person, place, and time. Mental status is at baseline.   Psychiatric:         Mood and Affect: Mood normal.         Behavior: Behavior normal.         Judgment: Judgment normal.          Results Review:  The following data was reviewed by YI Sanches [unfilled] 16:07 EDT.  Vitamin D 25 hydroxy (04/12/2024 08:08)  TSH Rfx On " Abnormal To Free T4 (04/12/2024 08:08)  Lipid Panel (04/12/2024 08:08)  Hepatitis C Antibody (04/12/2024 08:08)  Comprehensive Metabolic Panel (04/12/2024 08:08)  CBC & Differential (04/12/2024 08:08)    ASSESSMENT AND PLAN:  Diagnoses and all orders for this visit:    1. Primary hypertension (Primary)  -     lisinopril (PRINIVIL,ZESTRIL) 10 MG tablet; Take 1 tablet by mouth Daily.  Dispense: 90 tablet; Refill: 1  -     Basic metabolic panel; Future    2. Mixed hyperlipidemia  -     Lipid Panel; Future    3. Obesity (BMI 30.0-34.9)      Patient's blood pressure is improved.  Lisinopril refilled.  Will follow-up in 6 months for reassessment of her hypertension.  Ambulatory blood pressure monitoring encouraged as well as regular exercise.  Patient declined statin.  Her 10-year ASCVD risk is 3.3% but her lifetime risk is 39%.  Patient prefers to do lifestyle modification first.  We will plan to recheck her cholesterol in 6 months before her follow-up.      Follow Up   Return in about 6 months (around 10/30/2024). Patient to notify office with any acute concerns or issues.  Patient verbalizes understanding, agrees with plan of care and has no further questions upon discharge.     Patient was given instructions and counseling regarding her condition or for health maintenance advice. Please see specific information pulled into the AVS if appropriate.     Discussed the importance of following up with any needed screening tests/labs/specialist appointments and any requested follow-up recommended by me today. Importance of maintaining follow-up discussed and patient accepts that missed appointments can delay diagnosis and potentially lead to worsening of conditions.    Part of this note may be an electronic transcription/translation of spoken language to printed text using the Dragon Dictation System.

## 2024-06-27 ENCOUNTER — TELEPHONE (OUTPATIENT)
Dept: FAMILY MEDICINE CLINIC | Age: 54
End: 2024-06-27
Payer: COMMERCIAL

## 2024-06-27 ENCOUNTER — OFFICE VISIT (OUTPATIENT)
Dept: FAMILY MEDICINE CLINIC | Age: 54
End: 2024-06-27
Payer: COMMERCIAL

## 2024-06-27 ENCOUNTER — HOSPITAL ENCOUNTER (OUTPATIENT)
Dept: GENERAL RADIOLOGY | Facility: HOSPITAL | Age: 54
Discharge: HOME | End: 2024-06-27
Admitting: NURSE PRACTITIONER
Payer: COMMERCIAL

## 2024-06-27 VITALS
SYSTOLIC BLOOD PRESSURE: 150 MMHG | TEMPERATURE: 98.5 F | WEIGHT: 182.4 LBS | HEIGHT: 61 IN | DIASTOLIC BLOOD PRESSURE: 82 MMHG | HEART RATE: 75 BPM | BODY MASS INDEX: 34.44 KG/M2

## 2024-06-27 DIAGNOSIS — R07.81 RIB PAIN ON LEFT SIDE: ICD-10-CM

## 2024-06-27 DIAGNOSIS — S22.42XB OPEN FRACTURE OF MULTIPLE RIBS OF LEFT SIDE, INITIAL ENCOUNTER: Primary | ICD-10-CM

## 2024-06-27 DIAGNOSIS — Z79.899 HIGH RISK MEDICATION USE: ICD-10-CM

## 2024-06-27 DIAGNOSIS — S22.42XA CLOSED FRACTURE OF MULTIPLE RIBS OF LEFT SIDE, INITIAL ENCOUNTER: ICD-10-CM

## 2024-06-27 DIAGNOSIS — I10 PRIMARY HYPERTENSION: Primary | ICD-10-CM

## 2024-06-27 LAB
AMPHET+METHAMPHET UR QL: NEGATIVE
AMPHETAMINES UR QL: NEGATIVE
BARBITURATES UR QL SCN: NEGATIVE
BENZODIAZ UR QL SCN: NEGATIVE
BUPRENORPHINE SERPL-MCNC: NEGATIVE NG/ML
CANNABINOIDS SERPL QL: NEGATIVE
COCAINE UR QL: NEGATIVE
EXPIRATION DATE: NORMAL
Lab: NORMAL
MDMA UR QL SCN: NEGATIVE
METHADONE UR QL SCN: NEGATIVE
MORPHINE/OPIATES SCREEN, URINE: NEGATIVE
OXYCODONE UR QL SCN: NEGATIVE
PCP UR QL SCN: NEGATIVE

## 2024-06-27 PROCEDURE — 71101 X-RAY EXAM UNILAT RIBS/CHEST: CPT

## 2024-06-27 PROCEDURE — 80305 DRUG TEST PRSMV DIR OPT OBS: CPT | Performed by: NURSE PRACTITIONER

## 2024-06-27 PROCEDURE — 99213 OFFICE O/P EST LOW 20 MIN: CPT | Performed by: NURSE PRACTITIONER

## 2024-06-27 RX ORDER — OXYCODONE HYDROCHLORIDE AND ACETAMINOPHEN 5; 325 MG/1; MG/1
1 TABLET ORAL EVERY 6 HOURS PRN
Qty: 15 TABLET | Refills: 0 | Status: SHIPPED | OUTPATIENT
Start: 2024-06-27

## 2024-06-27 RX ORDER — HYDROCODONE BITARTRATE AND ACETAMINOPHEN 5; 325 MG/1; MG/1
1 TABLET ORAL EVERY 6 HOURS PRN
Qty: 30 TABLET | Refills: 0 | Status: CANCELLED | OUTPATIENT
Start: 2024-06-27

## 2024-06-27 NOTE — Clinical Note
I ordered an x-ray, should have made it a stat, can you see if radiology is still there and can read

## 2024-06-27 NOTE — PROGRESS NOTES
Alessandra Potts presents to [facility].    Chief Complaint:  Fall (Pain and bruising left side from fall on Monday. She fell getting off her boat. )      Here today with her .          History of Present Illness:    Joint pain:   After a fall to her left side ribs, getting off her boat, hit the side of the boat  Symptoms started:4 days ago   associated symptoms:bruising and to take a deep breath, pain trying to get some sleep   Treatment tried:Ice, Ibuprofen 600- 800 mg, helps some   Dg testing : none   Was in [place] on [facility], just got in from her drive back today.     PAST MEDICAL HISTORY changes:        Endometriosis     HTN      GYNECOLOGICAL HISTORY:     miscarriage 1    Contraception: S/P tubal ligation;         PREVENTIVE HEALTH MAINTENANCE               Surgical History:         Appendectomy    C section     Tonsillectomy/Adenoidectomy    Tubal Ligation         Family History:       Father: Hypertension; Hyperlipidemia     Mother: HLD, breast cancer     maternal aunts (3) with billieasr cancer         Social History:       Occupation: Teacher , [employer]     Marital Status:      Children: 4 children           Review of Systems:  Review of Systems   Constitutional:  Negative for fatigue and fever.   Respiratory:  Negative for cough and shortness of breath.    Cardiovascular:  Negative for palpitations and leg swelling.          Current Outpatient Medications:     cetirizine (zyrTEC) 10 MG tablet, Take 1 tablet by mouth Daily., Disp: , Rfl:     Cholecalciferol (Vitamin D3) 50 MCG (2000 UT) capsule, Take  by mouth Daily., Disp: , Rfl:     Cyanocobalamin (Vitamin B-12) 5000 MCG lozenge, Take  by mouth Daily., Disp: , Rfl:     lisinopril (PRINIVIL,ZESTRIL) 10 MG tablet, Take 1 tablet by mouth Daily., Disp: 90 tablet, Rfl: 1    MAGNESIUM GLYCINATE PO, Take 240 mg by mouth Daily., Disp: , Rfl:     multivitamin with minerals (MULTIVITAMIN ADULT PO), Take 1  "tablet by mouth Daily., Disp: , Rfl:     oxyCODONE-acetaminophen (Percocet) 5-325 MG per tablet, Take 1 tablet by mouth Every 6 (Six) Hours As Needed for Severe Pain., Disp: 15 tablet, Rfl: 0    Vital Signs:   Vitals:    06/27/24 1356   BP: 150/82   BP Location: Right arm   Patient Position: Sitting   Cuff Size: Large Adult   Pulse: 75   Temp: 98.5 °F (36.9 °C)   TempSrc: Oral   Weight: 82.7 kg (182 lb 6.4 oz)   Height: 155.6 cm (61.25\")              Physical Exam:  Physical Exam  Vitals reviewed.   Constitutional:       General: She is not in acute distress.     Appearance: Normal appearance.   Neck:      Vascular: No carotid bruit.   Cardiovascular:      Rate and Rhythm: Normal rate and regular rhythm.      Heart sounds: Normal heart sounds. No murmur heard.  Pulmonary:      Effort: Pulmonary effort is normal. No respiratory distress.      Breath sounds: Normal breath sounds.   Musculoskeletal:         General: Tenderness (very tender to palpation of left lateral ribs under the axialla and down to lower left ribs) present.      Right lower leg: No edema.      Left lower leg: No edema.   Skin:     Findings: Bruising (on lower left side of her lateral ribs) present.   Neurological:      Mental Status: She is alert.   Psychiatric:         Mood and Affect: Mood normal.         Behavior: Behavior normal.         Result Review      The following data was reviewed by: YI Clay on 06/27/2024:    Results for orders placed or performed in visit on 06/27/24   POC Medline 12 Panel Urine Drug Screen    Specimen: Urine   Result Value Ref Range    Amphetamine Screen, Urine Negative Negative    Barbiturates Screen, Urine Negative Negative    Buprenorphine, Screen, Urine Negative Negative    Benzodiazepine Screen, Urine Negative Negative    Cocaine Screen, Urine Negative Negative    MDMA (ECSTASY) Negative Negative    Methamphetamine, Ur Negative Negative    Morphine/Opiates Screen, Urine Negative Negative    " Methadone Screen, Urine Negative Negative    Oxycodone Screen, Urine Negative Negative    Phencyclidine (PCP), Urine Negative Negative    THC, Screen, Urine Negative Negative    Lot Number [id number]     Expiration Date 12/28/2025                Assessment and Plan:          Diagnoses and all orders for this visit:    1. Primary hypertension (Primary)  Assessment & Plan:  Takes her rx and when she checks, runs normal, continue her rx and monitoring her BP       2. Rib pain on left side  Assessment & Plan:  Sent for x-ray, to continue ice, Ibuprofen and tylenol can be used alternating and pain rx at night if needed ( she is headed back to [place] later this weekend) uses [facility]     Orders:  -     XR Ribs Left With PA Chest; Future    3. High risk medication use  Assessment & Plan:  Reviewed, discussed and signed controlled consent form; reviewed edelmira CUADRA negative     Orders:  -     POC Medline 12 Panel Urine Drug Screen                  Follow Up   Return if symptoms worsen or fail to improve, for follow up pending x-ray result.  Patient was given instructions and counseling regarding her condition or for health maintenance advice. Please see specific information pulled into the AVS if appropriate.

## 2024-06-27 NOTE — ASSESSMENT & PLAN NOTE
Sent for x-ray, to continue ice, Ibuprofen and tylenol can be used alternating and pain rx at night if needed ( she is headed back to [place] later this weekend) uses [facility]

## 2024-06-27 NOTE — TELEPHONE ENCOUNTER
Please inform patient. I can send in some pain medication for her. Please schedule her a follow up in a couple of weeks with her PCP

## 2024-06-27 NOTE — TELEPHONE ENCOUNTER
Radiology called with STAT call report on CXR.  Nondisplaced left seventh and eighth rib fracture. Sent to Francine Titus on call.

## 2024-07-18 ENCOUNTER — OFFICE VISIT (OUTPATIENT)
Dept: FAMILY MEDICINE CLINIC | Age: 54
End: 2024-07-18
Payer: COMMERCIAL

## 2024-07-18 VITALS
TEMPERATURE: 98.5 F | OXYGEN SATURATION: 95 % | WEIGHT: 181.8 LBS | DIASTOLIC BLOOD PRESSURE: 62 MMHG | BODY MASS INDEX: 34.32 KG/M2 | HEIGHT: 61 IN | SYSTOLIC BLOOD PRESSURE: 122 MMHG | HEART RATE: 80 BPM

## 2024-07-18 DIAGNOSIS — S22.42XD CLOSED FRACTURE OF MULTIPLE RIBS OF LEFT SIDE WITH ROUTINE HEALING, SUBSEQUENT ENCOUNTER: Primary | ICD-10-CM

## 2024-07-18 PROCEDURE — 99213 OFFICE O/P EST LOW 20 MIN: CPT | Performed by: NURSE PRACTITIONER

## 2024-07-18 NOTE — PROGRESS NOTES
"Alessandra Potts presents to [facility] with complaint of  Rib Injury (Follow up on recent fall on 6/14/24. )    SUBJECTIVE  History of Present Illness    Patient is here for rib fracture follow-up.  She was seen in the office by Jessica Love on June 27 3 days after she had fallen off a boat ramp and hit her left side.  She had x-rays obtained that showed Nondisplaced left 7th and 8th rib fractures.  She was treated with a short course of Percocet.  Since initial injury, patient says that she has had significant improvement in her pain.  She has some occasional left-sided soreness especially with certain positions.  She has been alternating Tylenol and ibuprofen and using heating pad frequently.    OBJECTIVE  Vital Signs:   /62 (BP Location: Left arm, Patient Position: Sitting)   Pulse 80   Temp 98.5 °F (36.9 °C) (Oral)   Ht 155.6 cm (61.25\")   Wt 82.5 kg (181 lb 12.8 oz)   SpO2 95% Comment: room air  BMI 34.07 kg/m²       Physical Exam  Vitals reviewed.   Constitutional:       General: She is not in acute distress.     Appearance: Normal appearance. She is not ill-appearing.   HENT:      Head: Normocephalic and atraumatic.      Nose: Nose normal.      Mouth/Throat:      Mouth: Mucous membranes are moist.      Pharynx: Oropharynx is clear.   Cardiovascular:      Rate and Rhythm: Normal rate and regular rhythm.      Pulses: Normal pulses.      Heart sounds: Normal heart sounds.   Pulmonary:      Effort: Pulmonary effort is normal.      Breath sounds: Normal breath sounds.   Musculoskeletal:      Cervical back: Neck supple.   Skin:     General: Skin is warm and dry.   Neurological:      General: No focal deficit present.      Mental Status: She is alert and oriented to person, place, and time. Mental status is at baseline.   Psychiatric:         Mood and Affect: Mood normal.         Behavior: Behavior normal.         Judgment: Judgment normal.          Results Review:  The " following data was reviewed by YI Sanches [unfilled] 13:40 EDT.  Office Visit with Jessica Love APRN (06/27/2024)   Appointment Information    PACS Images     Radiology Images  Study Result    Narrative & Impression   XR RIBS LEFT W PA CHEST     Date of Exam: 6/27/2024 2:24 PM EDT     Indication: fall, left rib pain     Comparison: None available.     FINDINGS:     The lungs are well-expanded. The heart and pulmonary vasculature are within normal limits. No pleural effusions are identified. There are no active appearing infiltrates. Nondisplaced left seventh and eighth rib fractures. No evidence of pneumothorax.     IMPRESSION:  Nondisplaced left seventh and eighth rib fracture.        Electronically Signed: Homer Haile MD    6/27/2024 4:51 PM EDT    Workstation ID: [id number]     ASSESSMENT AND PLAN:  Diagnoses and all orders for this visit:    1. Closed fracture of multiple ribs of left side with routine healing, subsequent encounter (Primary)  -     Cancel: XR Ribs 2 View Left; Future  -     XR Ribs 2 View Left; Future      Patient was encouraged to continue alternating Tylenol and ibuprofen for pain.  She may also continue heating pad.  Deep breathing and regular walking encouraged to prevent pneumonia.  Will reassess fractures in 3 weeks, this will be 6-week laura from initial injury.  X-ray ordered for her to come to the x-ray department 3 weeks from now.  Further management depending on follow-up x-ray and patient progression.      Follow Up   No follow-ups on file. Patient to notify office with any acute concerns or issues.  Patient verbalizes understanding, agrees with plan of care and has no further questions upon discharge.     Patient was given instructions and counseling regarding her condition or for health maintenance advice. Please see specific information pulled into the AVS if appropriate.     Discussed the importance of following up with any needed screening tests/labs/specialist  appointments and any requested follow-up recommended by me today. Importance of maintaining follow-up discussed and patient accepts that missed appointments can delay diagnosis and potentially lead to worsening of conditions.    Part of this note may be an electronic transcription/translation of spoken language to printed text using the Dragon Dictation System.

## 2024-08-09 ENCOUNTER — HOSPITAL ENCOUNTER (OUTPATIENT)
Dept: GENERAL RADIOLOGY | Facility: HOSPITAL | Age: 54
Discharge: HOME | End: 2024-08-09
Admitting: NURSE PRACTITIONER
Payer: COMMERCIAL

## 2024-08-09 DIAGNOSIS — S22.42XD CLOSED FRACTURE OF MULTIPLE RIBS OF LEFT SIDE WITH ROUTINE HEALING, SUBSEQUENT ENCOUNTER: ICD-10-CM

## 2024-08-09 PROCEDURE — 71100 X-RAY EXAM RIBS UNI 2 VIEWS: CPT

## 2024-08-16 ENCOUNTER — PATIENT ROUNDING (BHMG ONLY) (OUTPATIENT)
Age: 54
End: 2024-08-16
Payer: COMMERCIAL

## 2024-08-16 ENCOUNTER — OFFICE VISIT (OUTPATIENT)
Age: 54
End: 2024-08-16
Payer: COMMERCIAL

## 2024-08-16 VITALS
OXYGEN SATURATION: 97 % | DIASTOLIC BLOOD PRESSURE: 70 MMHG | BODY MASS INDEX: 33.62 KG/M2 | HEART RATE: 84 BPM | WEIGHT: 182.7 LBS | SYSTOLIC BLOOD PRESSURE: 138 MMHG | HEIGHT: 62 IN

## 2024-08-16 DIAGNOSIS — Z01.419 ENCOUNTER FOR GYNECOLOGICAL EXAMINATION WITHOUT ABNORMAL FINDING: Primary | ICD-10-CM

## 2024-08-16 DIAGNOSIS — N93.8 DUB (DYSFUNCTIONAL UTERINE BLEEDING): ICD-10-CM

## 2024-08-16 PROCEDURE — 87624 HPV HI-RISK TYP POOLED RSLT: CPT | Performed by: OBSTETRICS & GYNECOLOGY

## 2024-08-16 PROCEDURE — G0123 SCREEN CERV/VAG THIN LAYER: HCPCS | Performed by: OBSTETRICS & GYNECOLOGY

## 2024-08-16 RX ORDER — SODIUM CHLORIDE 9 MG/ML
40 INJECTION, SOLUTION INTRAVENOUS AS NEEDED
OUTPATIENT
Start: 2024-08-16

## 2024-08-16 RX ORDER — SODIUM CHLORIDE, SODIUM LACTATE, POTASSIUM CHLORIDE, CALCIUM CHLORIDE 600; 310; 30; 20 MG/100ML; MG/100ML; MG/100ML; MG/100ML
125 INJECTION, SOLUTION INTRAVENOUS CONTINUOUS
OUTPATIENT
Start: 2024-08-16

## 2024-08-16 RX ORDER — SODIUM CHLORIDE 0.9 % (FLUSH) 0.9 %
10 SYRINGE (ML) INJECTION AS NEEDED
OUTPATIENT
Start: 2024-08-16

## 2024-08-16 RX ORDER — SODIUM CHLORIDE 0.9 % (FLUSH) 0.9 %
3 SYRINGE (ML) INJECTION EVERY 12 HOURS SCHEDULED
OUTPATIENT
Start: 2024-08-16

## 2024-08-16 NOTE — PROGRESS NOTES
".\"A [employer] message has been sent to the patient for PATIENT ROUNDING with [facility]\"  "

## 2024-08-16 NOTE — ASSESSMENT & PLAN NOTE
Patient complains of irregular heavy cycles.  I discussed management options including hormonal treatments versus endometrial ablation.  She desires to proceed with endometrial ablation.  Will schedule for endometrial biopsy prior to surgery.

## 2024-08-16 NOTE — PROGRESS NOTES
"GYN Visit      Chief Complaint   Patient presents with    Gynecologic Exam     Referring Provider: Referring, [place] [address]    HPI:   Alessandra Potts is a 54 y.o. female who presents today for annual GYN exam.  She reports irregular heavy cycles.  She is up-to-date on mammogram.      History: PMHx, Meds, Allergies, PSHx, Social Hx, and POBHx all reviewed and updated.    PHYSICAL EXAM:  /70 (BP Location: Left arm, Patient Position: Sitting, Cuff Size: Adult)   Pulse 84   Ht 157.5 cm (62\")   Wt 82.9 kg (182 lb 11.2 oz)   LMP 07/25/2024 (Exact Date)   SpO2 97%   BMI 33.42 kg/m²   General- NAD, alert and oriented, appropriate  Psych- Normal mood, good memory    Abdomen- Soft, non distended, non tender, no masses    Breast left-  Bilaterally symmetrical, no masses, non tender, no nipple discharge  Breast right- Bilaterally symmetrical, no masses, non tender, no nipple discharge    External genitalia- Normal female, no lesions  Urethra/meatus- Normal, no masses, non tender, no prolapse  Bladder- Normal, no masses, non tender, no prolapse  Vagina- Normal, no atrophy, no lesions, no discharge, no prolapse  Cervix- Normal, no lesions, no discharge, No cervical motion tenderness  Uterus- Normal size, shape & consistency.  Non tender, mobile.  Adnexa- No mass, non tender  Anus/Rectum/Perineum- Not performed    Lymphatic- No palpable groin nodes  Extremities- No edema, no cyanosis    Skin- No lesions, no rashes    Procedures    ASSESSMENT AND PLAN:  Assessment & Plan  Encounter for gynecological examination without abnormal finding    DUB (dysfunctional uterine bleeding)  Patient complains of irregular heavy cycles.  I discussed management options including hormonal treatments versus endometrial ablation.  She desires to proceed with endometrial ablation.  Will schedule for endometrial biopsy prior to surgery.    Orders Placed This Encounter   Procedures    IgP, Aptima HPV      "       Preventative:  1. Annuals every year; Cytology collections per prevailing guidelines.   2. Mammograms begin every year at 41 yo if no abnormalities are found and no family history.  3. Bone density studies begin at 66 yo. If no fracture history or osteoporosis family history.  4. Colonoscopy begins at 44 yo. Repeat every ten years if negative and no family history.  5. Calcium of 1935-2595 mg/day in split dosing  6. Vitamin D 400-800 IU/day  7. All other preventative health recommendations will be managed by the patients Primary care physician.    Follow Up:  Return in about 1 year (around 8/16/2025).                  Derrick Billings MD  08/16/2024    [facility]  [address] [place] [id number]  Dept: [phone]  Dept Fax: [phone]  Loc: [phone]  Loc Fax: [phone]

## 2024-08-21 LAB
CYTOLOGIST CVX/VAG CYTO: NORMAL
CYTOLOGY CVX/VAG DOC CYTO: NORMAL
CYTOLOGY CVX/VAG DOC THIN PREP: NORMAL
DX ICD CODE: NORMAL
HPV I/H RISK 4 DNA CVX QL PROBE+SIG AMP: NEGATIVE
Lab: NORMAL
OTHER STN SPEC: NORMAL
STAT OF ADQ CVX/VAG CYTO-IMP: NORMAL

## 2024-09-20 ENCOUNTER — TELEPHONE (OUTPATIENT)
Age: 54
End: 2024-09-20
Payer: COMMERCIAL

## 2024-10-25 ENCOUNTER — TELEPHONE (OUTPATIENT)
Dept: FAMILY MEDICINE CLINIC | Age: 54
End: 2024-10-25
Payer: COMMERCIAL

## 2024-11-01 ENCOUNTER — TELEPHONE (OUTPATIENT)
Dept: FAMILY MEDICINE CLINIC | Age: 54
End: 2024-11-01
Payer: COMMERCIAL

## 2024-11-04 DIAGNOSIS — I10 PRIMARY HYPERTENSION: ICD-10-CM

## 2024-11-04 RX ORDER — LISINOPRIL 10 MG/1
10 TABLET ORAL DAILY
Qty: 90 TABLET | Refills: 1 | Status: SHIPPED | OUTPATIENT
Start: 2024-11-04

## 2024-11-04 NOTE — TELEPHONE ENCOUNTER
Caller: Alessandra Potts    Relationship: Self    Best call back number: [phone]     Requested Prescriptions:   Requested Prescriptions     Pending Prescriptions Disp Refills    lisinopril (PRINIVIL,ZESTRIL) 10 MG tablet 90 tablet 1     Sig: Take 1 tablet by mouth Daily.        Pharm[facility] where request should be sent: [facility] [place] [facility] [address] [facility] [phone] [facility] [phone] FX     Last office visit with prescribing clinician: 7/18/2024   Last telemedicine visit with prescribing clinician: Visit date not found   Next office visit with prescribing clinician: 11/19/2024       Does the patient have less than a 3 day supply:  [x] Yes  [] No    Tali Melendez Rep   11/04/24 11:39 EST

## 2024-11-19 ENCOUNTER — OFFICE VISIT (OUTPATIENT)
Dept: FAMILY MEDICINE CLINIC | Age: 54
End: 2024-11-19
Payer: COMMERCIAL

## 2024-11-19 VITALS
WEIGHT: 184 LBS | BODY MASS INDEX: 33.86 KG/M2 | OXYGEN SATURATION: 99 % | HEIGHT: 62 IN | SYSTOLIC BLOOD PRESSURE: 136 MMHG | HEART RATE: 71 BPM | DIASTOLIC BLOOD PRESSURE: 80 MMHG | TEMPERATURE: 98.3 F

## 2024-11-19 DIAGNOSIS — E66.811 OBESITY (BMI 30.0-34.9): ICD-10-CM

## 2024-11-19 DIAGNOSIS — I10 PRIMARY HYPERTENSION: Primary | ICD-10-CM

## 2024-11-19 DIAGNOSIS — E78.2 MIXED HYPERLIPIDEMIA: ICD-10-CM

## 2024-11-19 DIAGNOSIS — R53.83 OTHER FATIGUE: ICD-10-CM

## 2024-11-19 DIAGNOSIS — E55.9 VITAMIN D DEFICIENCY: ICD-10-CM

## 2024-11-19 PROCEDURE — 99214 OFFICE O/P EST MOD 30 MIN: CPT | Performed by: NURSE PRACTITIONER

## 2024-11-19 NOTE — PROGRESS NOTES
"Alessandra Potts presents to [facility] with complaint of  Hypertension (6 mo. F/U ) and Fatigue (Feeling tired all the time )    SUBJECTIVE    Patient is here for routine follow-up of hypertension.  He is currently on lisinopril 10 mg daily.  Blood pressure is considered borderline in office today however she reports her blood pressure typically runs 1 15-1 30 systolic.  She does occasionally see 135 systolic but not very often she says.  Tolerates the lisinopril well.  No symptoms reported, other than feeling fatigued.  Is been going on for the past several months.  Does not feel that she gets good quality sleep.  She does snore on occasion per her .  Does wake up with a dry mouth.  Does have history of vitamin D deficiency and is curious if her vitamin D level is low.  Also wondering if this could be related to menopause.      OBJECTIVE  Vital Signs:   /80 (BP Location: Left arm, Patient Position: Sitting, Cuff Size: Adult)   Pulse 71   Temp 98.3 °F (36.8 °C) (Oral)   Ht 157.5 cm (62\")   Wt 83.5 kg (184 lb)   SpO2 99%   BMI 33.65 kg/m²       Physical Exam  Vitals reviewed.   Constitutional:       General: She is not in acute distress.     Appearance: Normal appearance. She is not ill-appearing.   HENT:      Head: Normocephalic and atraumatic.      Nose: Nose normal.      Mouth/Throat:      Mouth: Mucous membranes are moist.      Pharynx: Oropharynx is clear.   Cardiovascular:      Rate and Rhythm: Normal rate and regular rhythm.      Pulses: Normal pulses.      Heart sounds: Normal heart sounds.   Pulmonary:      Effort: Pulmonary effort is normal.      Breath sounds: Normal breath sounds.   Musculoskeletal:      Cervical back: Neck supple.   Skin:     General: Skin is warm and dry.   Neurological:      General: No focal deficit present.      Mental Status: She is alert and oriented to person, place, and time. Mental status is at baseline.   Psychiatric:         Mood " and Affect: Mood normal.         Behavior: Behavior normal.         Judgment: Judgment normal.          Results Review:  The following data was reviewed by Pam Correa, YI [unfilled] 16:14 EST.  Vitamin D 25 hydroxy (04/12/2024 08:08)  TSH Rfx On Abnormal To Free T4 (04/12/2024 08:08)  Lipid Panel (04/12/2024 08:08)  Hepatitis C Antibody (04/12/2024 08:08)  Comprehensive Metabolic Panel (04/12/2024 08:08)  CBC & Differential (04/12/2024 08:08)    ASSESSMENT AND PLAN:  Diagnoses and all orders for this visit:    1. Primary hypertension (Primary)    2. Other fatigue  -     Vitamin B12; Future  -     Folate; Future  -     CJ; Future  -     TSH Rfx On Abnormal To Free T4; Future    3. Vitamin D deficiency  -     Vitamin D 25 hydroxy; Future    4. Obesity (BMI 30.0-34.9)    5. Mixed hyperlipidemia         Blood pressure is controlled with current regimen.  Continue lisinopril 10 mg daily.  If consistently greater than 135/85 would need to increase her lisinopril to 20 mg daily.  Regular exercise and low-sodium diet encouraged.  For her fatigue, checking labs as listed above.  Discussed with patient that there could be potential for her having sleep apnea.  If fatigue does not improve and labs are unrevealing, would like to refer her for sleep study.  Patient is agreeable to this if labs are normal.  Cholesterol levels were elevated at 228 total and  months ago.  She has order in her chart to have cholesterol rechecked.  She will come to the office to complete this tomorrow in addition to other labs ordered today.        Follow Up   Return in about 6 months (around 5/19/2025), or if symptoms worsen or fail to improve, for Annual physical. Patient to notify office with any acute concerns or issues.  Patient verbalizes understanding, agrees with plan of care and has no further questions upon discharge.     Patient was given instructions and counseling regarding her condition or for health maintenance advice. Please  see specific information pulled into the AVS if appropriate.     Discussed the importance of following up with any needed screening tests/labs/specialist appointments and any requested follow-up recommended by me today. Importance of maintaining follow-up discussed and patient accepts that missed appointments can delay diagnosis and potentially lead to worsening of conditions.    Part of this note may be an electronic transcription/translation of spoken language to printed text using the Dragon Dictation System.

## 2024-11-20 ENCOUNTER — LAB (OUTPATIENT)
Dept: LAB | Facility: HOSPITAL | Age: 54
End: 2024-11-20
Payer: COMMERCIAL

## 2024-11-20 DIAGNOSIS — I10 PRIMARY HYPERTENSION: ICD-10-CM

## 2024-11-20 DIAGNOSIS — E78.2 MIXED HYPERLIPIDEMIA: ICD-10-CM

## 2024-11-20 DIAGNOSIS — E55.9 VITAMIN D DEFICIENCY: ICD-10-CM

## 2024-11-20 DIAGNOSIS — R53.83 OTHER FATIGUE: ICD-10-CM

## 2024-11-20 LAB
25(OH)D3 SERPL-MCNC: 60.7 NG/ML (ref 30–100)
ANION GAP SERPL CALCULATED.3IONS-SCNC: 10.5 MMOL/L (ref 5–15)
BUN SERPL-MCNC: 18 MG/DL (ref 6–20)
BUN/CREAT SERPL: 24.3 (ref 7–25)
CALCIUM SPEC-SCNC: 9.2 MG/DL (ref 8.6–10.5)
CHLORIDE SERPL-SCNC: 105 MMOL/L (ref 98–107)
CHOLEST SERPL-MCNC: 230 MG/DL (ref 0–200)
CO2 SERPL-SCNC: 24.5 MMOL/L (ref 22–29)
CREAT SERPL-MCNC: 0.74 MG/DL (ref 0.57–1)
EGFRCR SERPLBLD CKD-EPI 2021: 97.5 ML/MIN/1.73
FOLATE SERPL-MCNC: 19.1 NG/ML (ref 4.78–24.2)
GLUCOSE SERPL-MCNC: 95 MG/DL (ref 65–99)
HDLC SERPL-MCNC: 42 MG/DL (ref 40–60)
LDLC SERPL CALC-MCNC: 166 MG/DL (ref 0–100)
LDLC/HDLC SERPL: 3.89 {RATIO}
POTASSIUM SERPL-SCNC: 4.5 MMOL/L (ref 3.5–5.2)
SODIUM SERPL-SCNC: 140 MMOL/L (ref 136–145)
TRIGL SERPL-MCNC: 123 MG/DL (ref 0–150)
TSH SERPL DL<=0.05 MIU/L-ACNC: 1.45 UIU/ML (ref 0.27–4.2)
VIT B12 BLD-MCNC: >2000 PG/ML (ref 211–946)
VLDLC SERPL-MCNC: 22 MG/DL (ref 5–40)

## 2024-11-20 PROCEDURE — 84443 ASSAY THYROID STIM HORMONE: CPT

## 2024-11-20 PROCEDURE — 80048 BASIC METABOLIC PNL TOTAL CA: CPT

## 2024-11-20 PROCEDURE — 36415 COLL VENOUS BLD VENIPUNCTURE: CPT

## 2024-11-20 PROCEDURE — 86038 ANTINUCLEAR ANTIBODIES: CPT

## 2024-11-20 PROCEDURE — 82746 ASSAY OF FOLIC ACID SERUM: CPT

## 2024-11-20 PROCEDURE — 82306 VITAMIN D 25 HYDROXY: CPT

## 2024-11-20 PROCEDURE — 82607 VITAMIN B-12: CPT

## 2024-11-20 PROCEDURE — 80061 LIPID PANEL: CPT

## 2024-11-21 LAB — ANA SER QL: NEGATIVE

## 2025-03-26 ENCOUNTER — TELEPHONE (OUTPATIENT)
Dept: FAMILY MEDICINE CLINIC | Age: 55
End: 2025-03-26
Payer: COMMERCIAL

## 2025-03-26 NOTE — TELEPHONE ENCOUNTER
HUB TO READ- ATC PT DUE TO [facility] OFFBOARDING AFTER MAY. PT NEEDS TO ESTABLISH WITH ANOTHER PROVIDER IF THEY WOULD LIKE TO CONTINUE TO BE SEEN HERE, OR IF THEY WISH TO FOLLOW HER TO HER [facility] OFFICE.

## 2025-05-09 DIAGNOSIS — I10 PRIMARY HYPERTENSION: ICD-10-CM

## 2025-05-09 NOTE — TELEPHONE ENCOUNTER
Caller: Alessandra Potts    Relationship: Self    Best call back number: [phone]     Requested Prescriptions:   Requested Prescriptions     Pending Prescriptions Disp Refills    lisinopril (PRINIVIL,ZESTRIL) 10 MG tablet 90 tablet 1     Sig: Take 1 tablet by mouth Daily.        Pharmacy where request should be sent: [facility] - [place], [place] - [address] [facility] [phone] [facility] [phone]      Last office visit with prescribing clinician: Visit date not found   Last telemedicine visit with prescribing clinician: Visit date not found   Next office visit with prescribing clinician: Visit date not found     Additional details provided by patient: PATIENT ONLY HAS ONE DOSE OF MEDICATION LEFT.     Does the patient have less than a 3 day supply:  [x] Yes  [] No    Would you like a call back once the refill request has been completed: [] Yes [x] No    If the office needs to give you a call back, can they leave a voicemail: [] Yes [x] No    Tali Voss Rep   05/09/25 16:36 EDT

## 2025-05-12 RX ORDER — LISINOPRIL 10 MG/1
10 TABLET ORAL DAILY
Qty: 90 TABLET | Refills: 1 | Status: SHIPPED | OUTPATIENT
Start: 2025-05-12

## 2025-05-12 NOTE — TELEPHONE ENCOUNTER
Rx Refill Note  Requested Prescriptions     Pending Prescriptions Disp Refills    lisinopril (PRINIVIL,ZESTRIL) 10 MG tablet 90 tablet 1     Sig: Take 1 tablet by mouth Daily.      Last office visit with prescribing clinician: 11/19/24  Last telemedicine visit with prescribing clinician: Visit date not found   Next office visit with prescribing clinician: Visit date not found   {    Pricilla Ruggiero LPN  05/12/25, 10:49 EDT    Lf-11/19/24 #90, rf-1    HAS NEW PT APPT 9/11/25    ON CALL FOR MILES

## 2025-08-20 ENCOUNTER — TELEPHONE (OUTPATIENT)
Age: 55
End: 2025-08-20